# Patient Record
Sex: MALE | Race: OTHER | URBAN - METROPOLITAN AREA
[De-identification: names, ages, dates, MRNs, and addresses within clinical notes are randomized per-mention and may not be internally consistent; named-entity substitution may affect disease eponyms.]

---

## 2019-08-19 NOTE — PATIENT DISCUSSION
PLAN: CE/IOL OS THEN OD, +/-I-STENT OU (PENDING VF RESULTS), GOAL VERO OU, P.O WITH DR. Oscar Sandra. WILL NEED TO REQUEST VISUAL FIELD FROM DR. CHAPA'S OFFICE TO DETERMINE STENTS AND LENS OPTIONS. IF FIELDS ARE CLEAR, THEN CANDIDATE FOR SYMFONY. DISCUSSED LIMITATIONS SECONDARY TO LASIK OU. [facility] DID A LATEX ALLERGY TEST AND PT WILL CALL US BACK AND LET US KNOW. NO VAN NEEDED. WANTS IMPRIMIS OU.

## 2019-08-19 NOTE — PATIENT DISCUSSION
**08/29/19 DR. CASANOVA CALLED PATIENT WITH VISUAL FIELD RESULTS. REC: I-STENT OU, PT HIGHLY MOTIVATED TO GET ADVANCED SYMFONY. UNDERSTANDS IF GLAUCOMA GETS WORSE HE MAY NEED AN IOL EXCHANGE IN THE FUTURE.

## 2019-09-09 NOTE — PATIENT DISCUSSION
PLAN: CE/IOL OS THEN OD, +/-I-STENT OU, GOAL VERO OU, P.O WITH DR. Mitch Edouard. CANDIDATE FOR SYMFONY. DISCUSSED LIMITATIONS SECONDARY TO LASIK OU. NO VAN NEEDED. WANTS IMPRIMIS OU.

## 2019-09-09 NOTE — PATIENT DISCUSSION
PLAN: CE/IOL OS THEN OD, +/-I-STENT OU (PENDING VF RESULTS), GOAL VERO OU, P.O WITH DR. Jaqueline Gutierrez. WILL NEED TO REQUEST VISUAL FIELD FROM DR. CHAPA'S OFFICE TO DETERMINE STENTS AND LENS OPTIONS. IF FIELDS ARE CLEAR, THEN CANDIDATE FOR SYMFONY. DISCUSSED LIMITATIONS SECONDARY TO LASIK OU. [address] [facility] DID A LATEX ALLERGY TEST AND PT WILL CALL US BACK AND LET US KNOW. NO VAN NEEDED. WANTS IMPRIMIS OU.

## 2019-09-09 NOTE — PATIENT DISCUSSION
PLAN: CE/IOL OS THEN OD, +/-I-STENT OU, GOAL VERO OU, P.O WITH DR. Yasmine Underwood. CANDIDATE FOR SYMFONY. DISCUSSED LIMITATIONS SECONDARY TO LASIK OU. NO VAN NEEDED. WANTS IMPRIMIS OU.

## 2019-09-16 NOTE — PATIENT DISCUSSION
PLAN: CE/IOL OS THEN OD, +/-I-STENT OU, GOAL VERO OU, P.O WITH DR. Margareth Velasquez. CANDIDATE FOR SYMFONY. DISCUSSED LIMITATIONS SECONDARY TO LASIK OU. NO VAN NEEDED. WANTS IMPRIMIS OU.

## 2019-09-16 NOTE — PATIENT DISCUSSION
1 WEEK  PO: Patient is doing well post-operatively. The importance of post-op drop compliance was emphasized. Drop schedule reviewed with patient. Patient to call if any visual changes or concerns.

## 2019-09-16 NOTE — PATIENT DISCUSSION
PLAN: CE/IOL OS THEN OD, +/-I-STENT OU, GOAL VERO OU, P.O WITH DR. Augie Salvador. CANDIDATE FOR SYMFONY. DISCUSSED LIMITATIONS SECONDARY TO LASIK OU. NO VAN NEEDED. WANTS IMPRIMIS OU.

## 2019-09-16 NOTE — PATIENT DISCUSSION
PLAN: CE/IOL OS THEN OD, +/-I-STENT OU, GOAL VERO OU, P.O WITH DR. Corinne Paradise. CANDIDATE FOR SYMFONY. DISCUSSED LIMITATIONS SECONDARY TO LASIK OU. NO VAN NEEDED. WANTS IMPRIMIS OU.

## 2019-09-16 NOTE — PATIENT DISCUSSION
PLAN: CE/IOL OS THEN OD, +/-I-STENT OU, GOAL VERO OU, P.O WITH DR. Basilio Shane. CANDIDATE FOR SYMFONY. DISCUSSED LIMITATIONS SECONDARY TO LASIK OU. NO VAN NEEDED. WANTS IMPRIMIS OU.

## 2020-03-16 NOTE — PROCEDURE NOTE: SURGICAL
Prior to commencing surgery patient identification, surgical procedure, site, and side were confirmed by Dr. Darren Bansal. Following topical proparacaine anesthesia, the patient was positioned at the YAG laser, a contact lens coupled to the cornea of the right eye with methylcellulose and an axial posterior capsulotomy performed without complication using 3.5 Mj x 16. Attention was then turned to the left eye and a contact lens coupled to the cornea of the left eye with methylcellulose and an axial posterior capsulotomy performed without complication using 3.4 Mj x28. Excess methylcellulose was washed from both eyes, one drop of Alphagan instilled in each eye and the patient returned to the holding area having tolerated the procedure well and without complication. <br />MRN: [id number]<GP /><br />

## 2020-03-16 NOTE — PATIENT DISCUSSION
**3/16/20 Pt will follow up with Dr Meche Holder . To send  back to Dr Kathe Lucas after 2 stable refractions for a follow up to discuss options . LVC vs LRI.

## 2020-03-16 NOTE — PATIENT DISCUSSION
**3/16/20 Pt will follow up with Dr Lalito Parra . To send  back to Dr Nathaniel Howard after 2 stable refractions for a follow up to discuss options . LVC vs LRI.

## 2021-01-25 NOTE — PATIENT DISCUSSION
DISCUSSED OPTIONS. PLAN: LRI OD THEN OS, 1DPO DR. CHAPA, NO VAN NEEDED. WANTS IMPRIMIS OU. **NO CHARGE FOR LRI PER JWK**.

## 2021-01-25 NOTE — PATIENT DISCUSSION
PLAN: LRI OD THEN OS, 1DPO DR. CHAPA, NO VAN NEEDED. WANTS IMPRIMIS OU. **NO CHARGE FOR LRI PER JWK**.

## 2021-03-16 NOTE — PATIENT DISCUSSION
Monitor. Reviewed with Dr Jaz Nash. Pt to continue observing, sitting in sitz bat 5 min, no creams. If things worsen call the office, otherwise follow up in the 6 weeks for possible Colonoscopy. Pt stated understanding.

## 2021-05-18 ENCOUNTER — IMPORTED ENCOUNTER (OUTPATIENT)
Dept: URBAN - METROPOLITAN AREA CLINIC 9 | Facility: CLINIC | Age: 61
End: 2021-05-18

## 2021-05-25 ENCOUNTER — IMPORTED ENCOUNTER (OUTPATIENT)
Dept: URBAN - METROPOLITAN AREA CLINIC 9 | Facility: CLINIC | Age: 61
End: 2021-05-25

## 2021-07-14 ENCOUNTER — IMPORTED ENCOUNTER (OUTPATIENT)
Dept: URBAN - METROPOLITAN AREA CLINIC 9 | Facility: CLINIC | Age: 61
End: 2021-07-14

## 2021-07-28 ENCOUNTER — IMPORTED ENCOUNTER (OUTPATIENT)
Dept: URBAN - METROPOLITAN AREA CLINIC 9 | Facility: CLINIC | Age: 61
End: 2021-07-28

## 2021-09-14 ENCOUNTER — IMPORTED ENCOUNTER (OUTPATIENT)
Dept: URBAN - METROPOLITAN AREA CLINIC 9 | Facility: CLINIC | Age: 61
End: 2021-09-14

## 2021-09-14 PROBLEM — D31.31: Noted: 2021-09-14

## 2021-09-14 PROBLEM — Z96.1: Noted: 2021-09-14

## 2021-10-16 ASSESSMENT — TONOMETRY
OS_IOP_MMHG: 15
OS_IOP_MMHG: 12
OD_IOP_MMHG: 12
OS_IOP_MMHG: 12
OD_IOP_MMHG: 9
OS_IOP_MMHG: 15
OD_IOP_MMHG: 14
OD_IOP_MMHG: 21

## 2021-10-16 ASSESSMENT — VISUAL ACUITY
OD_SC: 20/20 - SN
OD_CC: 20/20 SN
OD_CC: 20/70 SN
OD_CC: 20/60 SN
OS_CC: 20/20 SN
OS_SC: 20/20 - SN
OS_SC: 20/70 SN
OS_CC: 20/60 SN
OD_CC: 20/60 SN
OS_CC: 20/50 SN
OD_SC: 20/50 SN
OD_CC: 20/70 SN
OS_CC: 20/60 SN
OD_SC: 20/30 SN
OS_CC: 20/50 SN
OS_SC: 20/30 SN

## 2021-10-16 ASSESSMENT — KERATOMETRY
OS_K2POWER_DIOPTERS: 43
OD_AXISANGLE2_DEGREES: 105
OS_AXISANGLE2_DEGREES: 40
OS_AXISANGLE_DEGREES: 130
OS_K1POWER_DIOPTERS: 42.25
OD_AXISANGLE_DEGREES: 15
OD_K1POWER_DIOPTERS: 42.5
OD_K2POWER_DIOPTERS: 42.75

## 2021-12-14 ENCOUNTER — ESTABLISHED PATIENT (OUTPATIENT)
Dept: URBAN - METROPOLITAN AREA CLINIC 9 | Facility: CLINIC | Age: 61
End: 2021-12-14

## 2021-12-14 DIAGNOSIS — H02.834: ICD-10-CM

## 2021-12-14 DIAGNOSIS — H02.831: ICD-10-CM

## 2021-12-14 DIAGNOSIS — H57.813: ICD-10-CM

## 2021-12-14 PROCEDURE — 99213 OFFICE O/P EST LOW 20 MIN: CPT

## 2021-12-14 PROCEDURE — 92285 EXTERNAL OCULAR PHOTOGRAPHY: CPT

## 2021-12-14 ASSESSMENT — VISUAL ACUITY
OS_SC: 20/20-1
OD_SC: 20/20-1

## 2023-07-11 NOTE — PATIENT DISCUSSION
Recommended artificial tears to use: 1 drop 4x a day in both eyes. Bactrim Pregnancy And Lactation Text: This medication is Pregnancy Category D and is known to cause fetal risk.  It is also excreted in breast milk.